# Patient Record
Sex: FEMALE | Race: WHITE | NOT HISPANIC OR LATINO | Employment: FULL TIME | URBAN - METROPOLITAN AREA
[De-identification: names, ages, dates, MRNs, and addresses within clinical notes are randomized per-mention and may not be internally consistent; named-entity substitution may affect disease eponyms.]

---

## 2021-12-23 PROCEDURE — U0004 COV-19 TEST NON-CDC HGH THRU: HCPCS | Performed by: PERSONAL EMERGENCY RESPONSE ATTENDANT

## 2021-12-26 ENCOUNTER — TELEPHONE (OUTPATIENT)
Dept: URGENT CARE | Facility: CLINIC | Age: 36
End: 2021-12-26

## 2021-12-26 NOTE — TELEPHONE ENCOUNTER
12/26/2021 9:24 am called and verified patients birth date, and informed her that the covid test was negative.  Pt stated she is feeling better, slm

## 2022-06-22 PROBLEM — U07.1 COVID-19: Status: ACTIVE | Noted: 2022-05-24

## 2022-06-22 PROBLEM — M25.569 KNEE PAIN: Status: ACTIVE | Noted: 2017-02-15

## 2022-06-27 ENCOUNTER — LAB (OUTPATIENT)
Dept: LAB | Facility: HOSPITAL | Age: 37
End: 2022-06-27

## 2022-06-27 ENCOUNTER — OFFICE VISIT (OUTPATIENT)
Dept: GASTROENTEROLOGY | Facility: CLINIC | Age: 37
End: 2022-06-27

## 2022-06-27 VITALS
SYSTOLIC BLOOD PRESSURE: 122 MMHG | WEIGHT: 293 LBS | DIASTOLIC BLOOD PRESSURE: 80 MMHG | HEART RATE: 74 BPM | HEIGHT: 72 IN | TEMPERATURE: 97.4 F | OXYGEN SATURATION: 99 % | BODY MASS INDEX: 39.68 KG/M2

## 2022-06-27 DIAGNOSIS — K90.0 CELIAC SPRUE: ICD-10-CM

## 2022-06-27 DIAGNOSIS — K90.0 CELIAC SPRUE: Primary | ICD-10-CM

## 2022-06-27 LAB
25(OH)D3 SERPL-MCNC: 44.2 NG/ML (ref 30–100)
IRON 24H UR-MRATE: 48 MCG/DL (ref 37–145)
IRON SATN MFR SERPL: 11 % (ref 20–50)
TIBC SERPL-MCNC: 419 MCG/DL (ref 298–536)
TRANSFERRIN SERPL-MCNC: 281 MG/DL (ref 200–360)

## 2022-06-27 PROCEDURE — 84466 ASSAY OF TRANSFERRIN: CPT

## 2022-06-27 PROCEDURE — 99204 OFFICE O/P NEW MOD 45 MIN: CPT | Performed by: INTERNAL MEDICINE

## 2022-06-27 PROCEDURE — 82306 VITAMIN D 25 HYDROXY: CPT

## 2022-06-27 PROCEDURE — 83540 ASSAY OF IRON: CPT

## 2022-06-27 NOTE — PROGRESS NOTES
Patient Name: Adrianna Mendoza  YOB: 1985   Medical Record number: [id number]     PCP: Shawn Duenas MD    Chief Complaint   Patient presents with   • Celiac Disease       History of Present Illness:   HPI  I appreciate the consult for celiac sprue.  Ms. Mendoza is a 37-year-old with a history of asthma and hypertension.  She was recently diagnosed with celiac sprue after having endoscopy with small bowel biopsy performed by Dr. Aquino at the [facility].  The patient also had an elevated TTG level.  Ms. Mendoza states that she had experienced issues with abdominal discomfort that was generalized in location.  The patient was also having issues with bloating and diarrhea.  The patient did not having any problem with blood in the stool. Ms. Mendoza did see a nutrition specialist at the [facility] and has begun to consume a gluten-free diet.  The patient has improvement with  more formed stool and less bloating.  There is no history of unexplained skin rash.  She denies any joint pain at this time.  There is no history of night sweats, fever or chills.  There is a family history of rheumatoid arthritis in her mother.  Her maternal grandmother also had colon cancer.  Past Medical History:   Diagnosis Date   • Asthma    • Celiac disease    • Hypertension        Past Surgical History:   Procedure Laterality Date   • DENTAL PROCEDURE     • TONSILLECTOMY           Current Outpatient Medications:   •  bisoprolol-hydrochlorothiazide (ZIAC) 10-6.25 MG per tablet, , Disp: , Rfl:   •  Fexofenadine HCl (ALLEGRA ALLERGY PO), Allegra Allergy, Disp: , Rfl:   •  fluticasone (FLONASE) 50 MCG/ACT nasal spray, fluticasone propionate 50 mcg/actuation nasal spray,suspension  Spray 2 sprays every day by intranasal route., Disp: , Rfl:   •  montelukast (SINGULAIR) 10 MG tablet, montelukast 10 mg tablet  Take 1 tablet every day by oral route for 90 days., Disp: , Rfl:   •  omeprazole (priLOSEC) 20 MG  capsule, omeprazole 20 mg capsule,delayed release  Take 1 capsule every day by oral route., Disp: , Rfl:   •  Semaglutide-Weight Management (Wegovy) 2.4 MG/0.75ML solution auto-injector, Wegovy 2.4 mg/0.75 mL subcutaneous pen injector, Disp: , Rfl:   •  sertraline (ZOLOFT) 100 MG tablet, sertraline 100 mg tablet  take one tablet by mouth daily, Disp: , Rfl:     Allergies   Allergen Reactions   • Cinnamon Swelling       Family History   Problem Relation Age of Onset   • Colon cancer Maternal Grandmother    Mother -Rheumatoid arthritis    Social History     Socioeconomic History   • Marital status: Single   Tobacco Use   • Smoking status: Never Smoker   • Smokeless tobacco: Never Used   Vaping Use   • Vaping Use: Never used   Substance and Sexual Activity   • Alcohol use: Never   • Drug use: Never   • Sexual activity: Defer       Review of Systems   Constitutional: Negative for activity change, appetite change, fatigue, fever and unexpected weight change.   HENT: Negative for dental problem, hearing loss, mouth sores, postnasal drip, sneezing, trouble swallowing and voice change.    Eyes: Negative for pain, redness, itching and visual disturbance.   Respiratory: Negative for cough, choking, chest tightness, shortness of breath and wheezing.    Cardiovascular: Negative for chest pain, palpitations and leg swelling.   Gastrointestinal: Negative for abdominal distention, abdominal pain, anal bleeding, blood in stool, constipation, diarrhea, nausea, rectal pain and vomiting.   Endocrine: Negative for cold intolerance, heat intolerance, polydipsia, polyphagia and polyuria.   Genitourinary: Negative.  Negative for dysuria, enuresis, flank pain, hematuria and urgency.   Musculoskeletal: Negative for arthralgias, back pain, gait problem, joint swelling and myalgias.   Skin: Negative for color change, pallor and rash.   Allergic/Immunologic: Negative for environmental allergies, food allergies and immunocompromised state.    Neurological: Negative for dizziness, tremors, seizures, facial asymmetry, speech difficulty, numbness and headaches.   Hematological: Negative for adenopathy.   Psychiatric/Behavioral: Negative for behavioral problems, confusion, dysphoric mood, hallucinations and self-injury.       Vitals:    06/27/22 1520   BP: 122/80   Pulse: 74   Temp: 97.4 °F (36.3 °C)   SpO2: 99%       Physical Exam  Vitals reviewed.   Constitutional:       Appearance: She is obese. She is not ill-appearing.   HENT:      Head: Normocephalic and atraumatic.      Nose: Nose normal.      Mouth/Throat:      Mouth: Mucous membranes are moist.      Pharynx: Oropharynx is clear.   Eyes:      General: No scleral icterus.     Extraocular Movements: Extraocular movements intact.   Cardiovascular:      Rate and Rhythm: Normal rate and regular rhythm.      Heart sounds: No murmur heard.  Pulmonary:      Breath sounds: Normal breath sounds. No wheezing or rales.   Abdominal:      General: Bowel sounds are normal.      Palpations: Abdomen is soft.      Tenderness: There is no abdominal tenderness. There is no guarding.   Musculoskeletal:         General: No swelling or tenderness. Normal range of motion.      Cervical back: Normal range of motion and neck supple.   Skin:     General: Skin is dry.   Neurological:      Mental Status: She is alert and oriented to person, place, and time.   Psychiatric:         Mood and Affect: Mood normal.         Thought Content: Thought content normal.         Judgment: Judgment normal.         Diagnoses and all orders for this visit:    1. Celiac sprue (Primary)  -     Vitamin D 25 Hydroxy; Future  -     Iron Profile; Future    The patient has celiac sprue based upon the pathology and the abnormal celiac panel.  The symptoms have improved with a gluten-free diet.  Discussed the association of vitamin D and iron deficiency.  Also discussed the association with other autoimmune conditions.      Plan: Will check vitamin D  level.           Will check iron profile.           The patient was given the website for the Celiac Sprue Association.           Discussed having follow-up with a nutrition specialist on an annual basis.           Will follow-up in the office in 6 months.

## 2022-06-29 ENCOUNTER — TELEPHONE (OUTPATIENT)
Dept: GASTROENTEROLOGY | Facility: CLINIC | Age: 37
End: 2022-06-29

## 2023-01-09 ENCOUNTER — HOSPITAL ENCOUNTER (EMERGENCY)
Facility: HOSPITAL | Age: 38
Discharge: HOME | End: 2023-01-09
Attending: EMERGENCY MEDICINE | Admitting: EMERGENCY MEDICINE
Payer: COMMERCIAL

## 2023-01-09 ENCOUNTER — APPOINTMENT (OUTPATIENT)
Dept: GENERAL RADIOLOGY | Facility: HOSPITAL | Age: 38
End: 2023-01-09
Payer: COMMERCIAL

## 2023-01-09 VITALS
HEIGHT: 72 IN | TEMPERATURE: 97.7 F | RESPIRATION RATE: 17 BRPM | DIASTOLIC BLOOD PRESSURE: 65 MMHG | SYSTOLIC BLOOD PRESSURE: 114 MMHG | BODY MASS INDEX: 39.68 KG/M2 | OXYGEN SATURATION: 99 % | HEART RATE: 64 BPM | WEIGHT: 293 LBS

## 2023-01-09 DIAGNOSIS — R07.9 CHEST PAIN, UNSPECIFIED TYPE: Primary | ICD-10-CM

## 2023-01-09 LAB
ALBUMIN SERPL-MCNC: 4.5 G/DL (ref 3.5–5.2)
ALBUMIN/GLOB SERPL: 1.7 G/DL
ALP SERPL-CCNC: 90 U/L (ref 39–117)
ALT SERPL W P-5'-P-CCNC: 20 U/L (ref 1–33)
ANION GAP SERPL CALCULATED.3IONS-SCNC: 12 MMOL/L (ref 5–15)
AST SERPL-CCNC: 20 U/L (ref 1–32)
BASOPHILS # BLD AUTO: 0.04 10*3/MM3 (ref 0–0.2)
BASOPHILS NFR BLD AUTO: 0.7 % (ref 0–1.5)
BILIRUB SERPL-MCNC: 0.3 MG/DL (ref 0–1.2)
BUN SERPL-MCNC: 16 MG/DL (ref 6–20)
BUN/CREAT SERPL: 21.3 (ref 7–25)
CALCIUM SPEC-SCNC: 9.4 MG/DL (ref 8.6–10.5)
CHLORIDE SERPL-SCNC: 101 MMOL/L (ref 98–107)
CO2 SERPL-SCNC: 25 MMOL/L (ref 22–29)
CREAT SERPL-MCNC: 0.75 MG/DL (ref 0.57–1)
D DIMER PPP FEU-MCNC: <0.27 MCGFEU/ML (ref 0–0.5)
DEPRECATED RDW RBC AUTO: 44.6 FL (ref 37–54)
EGFRCR SERPLBLD CKD-EPI 2021: 104.7 ML/MIN/1.73
EOSINOPHIL # BLD AUTO: 0.21 10*3/MM3 (ref 0–0.4)
EOSINOPHIL NFR BLD AUTO: 3.5 % (ref 0.3–6.2)
ERYTHROCYTE [DISTWIDTH] IN BLOOD BY AUTOMATED COUNT: 13.9 % (ref 12.3–15.4)
GLOBULIN UR ELPH-MCNC: 2.7 GM/DL
GLUCOSE SERPL-MCNC: 90 MG/DL (ref 65–99)
HCT VFR BLD AUTO: 40.7 % (ref 34–46.6)
HGB BLD-MCNC: 13.3 G/DL (ref 12–15.9)
HOLD SPECIMEN: NORMAL
IMM GRANULOCYTES # BLD AUTO: 0.03 10*3/MM3 (ref 0–0.05)
IMM GRANULOCYTES NFR BLD AUTO: 0.5 % (ref 0–0.5)
LIPASE SERPL-CCNC: 41 U/L (ref 13–60)
LYMPHOCYTES # BLD AUTO: 1.81 10*3/MM3 (ref 0.7–3.1)
LYMPHOCYTES NFR BLD AUTO: 29.9 % (ref 19.6–45.3)
MCH RBC QN AUTO: 28.7 PG (ref 26.6–33)
MCHC RBC AUTO-ENTMCNC: 32.7 G/DL (ref 31.5–35.7)
MCV RBC AUTO: 87.7 FL (ref 79–97)
MONOCYTES # BLD AUTO: 0.38 10*3/MM3 (ref 0.1–0.9)
MONOCYTES NFR BLD AUTO: 6.3 % (ref 5–12)
NEUTROPHILS NFR BLD AUTO: 3.58 10*3/MM3 (ref 1.7–7)
NEUTROPHILS NFR BLD AUTO: 59.1 % (ref 42.7–76)
NRBC BLD AUTO-RTO: 0 /100 WBC (ref 0–0.2)
NT-PROBNP SERPL-MCNC: 6 PG/ML (ref 0–450)
PLATELET # BLD AUTO: 247 10*3/MM3 (ref 140–450)
PMV BLD AUTO: 9.8 FL (ref 6–12)
POTASSIUM SERPL-SCNC: 4 MMOL/L (ref 3.5–5.2)
PROT SERPL-MCNC: 7.2 G/DL (ref 6–8.5)
QT INTERVAL: 406 MS
QT INTERVAL: 422 MS
QTC INTERVAL: 445 MS
QTC INTERVAL: 450 MS
RBC # BLD AUTO: 4.64 10*6/MM3 (ref 3.77–5.28)
SODIUM SERPL-SCNC: 138 MMOL/L (ref 136–145)
TROPONIN T SERPL-MCNC: <0.01 NG/ML (ref 0–0.03)
TROPONIN T SERPL-MCNC: <0.01 NG/ML (ref 0–0.03)
WBC NRBC COR # BLD: 6.05 10*3/MM3 (ref 3.4–10.8)
WHOLE BLOOD HOLD COAG: NORMAL
WHOLE BLOOD HOLD SPECIMEN: NORMAL

## 2023-01-09 PROCEDURE — 99283 EMERGENCY DEPT VISIT LOW MDM: CPT

## 2023-01-09 PROCEDURE — 93005 ELECTROCARDIOGRAM TRACING: CPT | Performed by: EMERGENCY MEDICINE

## 2023-01-09 PROCEDURE — 85025 COMPLETE CBC W/AUTO DIFF WBC: CPT | Performed by: EMERGENCY MEDICINE

## 2023-01-09 PROCEDURE — 36415 COLL VENOUS BLD VENIPUNCTURE: CPT

## 2023-01-09 PROCEDURE — 83690 ASSAY OF LIPASE: CPT | Performed by: EMERGENCY MEDICINE

## 2023-01-09 PROCEDURE — 85379 FIBRIN DEGRADATION QUANT: CPT | Performed by: NURSE PRACTITIONER

## 2023-01-09 PROCEDURE — 84484 ASSAY OF TROPONIN QUANT: CPT | Performed by: EMERGENCY MEDICINE

## 2023-01-09 PROCEDURE — 80053 COMPREHEN METABOLIC PANEL: CPT | Performed by: EMERGENCY MEDICINE

## 2023-01-09 PROCEDURE — 71045 X-RAY EXAM CHEST 1 VIEW: CPT

## 2023-01-09 PROCEDURE — 83880 ASSAY OF NATRIURETIC PEPTIDE: CPT | Performed by: EMERGENCY MEDICINE

## 2023-01-09 PROCEDURE — 93005 ELECTROCARDIOGRAM TRACING: CPT

## 2023-01-09 RX ORDER — SODIUM CHLORIDE 0.9 % (FLUSH) 0.9 %
10 SYRINGE (ML) INJECTION AS NEEDED
Status: DISCONTINUED | OUTPATIENT
Start: 2023-01-09 | End: 2023-01-09 | Stop reason: HOSPADM

## 2023-01-09 RX ORDER — ASPIRIN 81 MG/1
324 TABLET, CHEWABLE ORAL ONCE
Status: DISCONTINUED | OUTPATIENT
Start: 2023-01-09 | End: 2023-01-09 | Stop reason: HOSPADM

## 2023-01-09 RX ORDER — KETOROLAC TROMETHAMINE 15 MG/ML
15 INJECTION, SOLUTION INTRAMUSCULAR; INTRAVENOUS ONCE
Status: DISCONTINUED | OUTPATIENT
Start: 2023-01-09 | End: 2023-01-09 | Stop reason: HOSPADM

## 2023-01-09 NOTE — ED PROVIDER NOTES
Subjective   History of Present Illness  Adrianna Mendoza is a 37 yr old female that presents to the ER with c/o chest pain pain is on the right side of her chest.  Patient denies any shortness of breath.  Negative for nausea, vomiting.  She denies abdominal pain.  Negative for fever and chills.  She denies cough and congestion.  Negative for abdominal pain.  She denies any recent travel or immobilization.  No history of DVT or PE.  Patient's never had a cardiac stress or cardiac cath.    History provided by:  Patient   used: No    Chest Pain  Pain location:  R chest  Pain quality: aching    Pain radiates to:  Does not radiate  Pain severity:  Mild  Timing:  Constant  Progression:  Unchanged  Associated symptoms: no abdominal pain, no back pain, no cough, no dizziness, no dysphagia, no fatigue, no fever, no lower extremity edema, no nausea, no palpitations, no shortness of breath, no vomiting and no weakness        Review of Systems   Constitutional: Negative for chills, fatigue and fever.   HENT: Negative for trouble swallowing.    Respiratory: Negative for cough and shortness of breath.    Cardiovascular: Positive for chest pain. Negative for palpitations.   Gastrointestinal: Negative for abdominal pain, nausea and vomiting.   Musculoskeletal: Negative for back pain.   Neurological: Negative for dizziness and weakness.   All other systems reviewed and are negative.      Past Medical History:   Diagnosis Date   • Asthma    • Celiac disease    • Hypertension        Allergies   Allergen Reactions   • Cinnamon Swelling       Past Surgical History:   Procedure Laterality Date   • DENTAL PROCEDURE     • TONSILLECTOMY         Family History   Problem Relation Age of Onset   • Colon cancer Maternal Grandmother        Social History     Socioeconomic History   • Marital status: Single   Tobacco Use   • Smoking status: Never   • Smokeless tobacco: Never   Vaping Use   • Vaping Use: Never used    Substance and Sexual Activity   • Alcohol use: Never   • Drug use: Never   • Sexual activity: Defer           Objective   Physical Exam  Vitals and nursing note reviewed.   Constitutional:       Appearance: Normal appearance. She is well-developed. She is not toxic-appearing.   HENT:      Head: Normocephalic and atraumatic.   Eyes:      General: Lids are normal.      Extraocular Movements: Extraocular movements intact.      Conjunctiva/sclera: Conjunctivae normal.      Pupils: Pupils are equal, round, and reactive to light.   Neck:      Trachea: Trachea normal.   Cardiovascular:      Rate and Rhythm: Normal rate and regular rhythm.      Pulses: Normal pulses.      Heart sounds: Normal heart sounds.   Pulmonary:      Effort: Pulmonary effort is normal. No respiratory distress.      Breath sounds: Normal breath sounds. No decreased breath sounds, wheezing, rhonchi or rales.   Abdominal:      General: Bowel sounds are normal.      Palpations: Abdomen is soft.      Tenderness: There is no abdominal tenderness.   Musculoskeletal:         General: Normal range of motion.      Cervical back: Full passive range of motion without pain and normal range of motion.   Skin:     General: Skin is warm and dry.      Findings: No rash.   Neurological:      Mental Status: She is alert and oriented to person, place, and time.      Cranial Nerves: No cranial nerve deficit.   Psychiatric:         Speech: Speech normal.         Behavior: Behavior normal. Behavior is cooperative.         Procedures           ED Course  ED Course as of 01/09/23 1310   Mon Jan 09, 2023   1100 EKG was reviewed sinus rhythm no obvious signs of ischemia, injury or infarct. [KG]   1305 Second EKG reviewed.  Rate is 67 normal sinus rhythm with no obvious signs of ischemia, injury or infarct.  Patient has had 2 negative sets of cardiac enzymes.  Chest x-ray is normal.  Patient will be discharged home.  Patient to follow-up as outpatient.    Differential  diagnosis: Chest pain, MI, PE, pneumonia [KG]      ED Course User Index  [KG] Cayla Holden RUBY, APRN           Recent Results (from the past 24 hour(s))   ECG 12 Lead ED Triage Standing Order; Chest Pain    Collection Time: 01/09/23 10:01 AM   Result Value Ref Range    QT Interval 406 ms    QTC Interval 450 ms   Troponin    Collection Time: 01/09/23 10:05 AM    Specimen: Blood   Result Value Ref Range    Troponin T <0.010 0.000 - 0.030 ng/mL   Comprehensive Metabolic Panel    Collection Time: 01/09/23 10:05 AM    Specimen: Blood   Result Value Ref Range    Glucose 90 65 - 99 mg/dL    BUN 16 6 - 20 mg/dL    Creatinine 0.75 0.57 - 1.00 mg/dL    Sodium 138 136 - 145 mmol/L    Potassium 4.0 3.5 - 5.2 mmol/L    Chloride 101 98 - 107 mmol/L    CO2 25.0 22.0 - 29.0 mmol/L    Calcium 9.4 8.6 - 10.5 mg/dL    Total Protein 7.2 6.0 - 8.5 g/dL    Albumin 4.5 3.5 - 5.2 g/dL    ALT (SGPT) 20 1 - 33 U/L    AST (SGOT) 20 1 - 32 U/L    Alkaline Phosphatase 90 39 - 117 U/L    Total Bilirubin 0.3 0.0 - 1.2 mg/dL    Globulin 2.7 gm/dL    A/G Ratio 1.7 g/dL    BUN/Creatinine Ratio 21.3 7.0 - 25.0    Anion Gap 12.0 5.0 - 15.0 mmol/L    eGFR 104.7 >60.0 mL/min/1.73   Lipase    Collection Time: 01/09/23 10:05 AM    Specimen: Blood   Result Value Ref Range    Lipase 41 13 - 60 U/L   BNP    Collection Time: 01/09/23 10:05 AM    Specimen: Blood   Result Value Ref Range    proBNP 6.0 0.0 - 450.0 pg/mL   Green Top (Gel)    Collection Time: 01/09/23 10:05 AM   Result Value Ref Range    Extra Tube Hold for add-ons.    Lavender Top    Collection Time: 01/09/23 10:05 AM   Result Value Ref Range    Extra Tube hold for add-on    Gold Top - SST    Collection Time: 01/09/23 10:05 AM   Result Value Ref Range    Extra Tube Hold for add-ons.    Light Blue Top    Collection Time: 01/09/23 10:05 AM   Result Value Ref Range    Extra Tube Hold for add-ons.    CBC Auto Differential    Collection Time: 01/09/23 10:05 AM    Specimen: Blood   Result Value Ref  Range    WBC 6.05 3.40 - 10.80 10*3/mm3    RBC 4.64 3.77 - 5.28 10*6/mm3    Hemoglobin 13.3 12.0 - 15.9 g/dL    Hematocrit 40.7 34.0 - 46.6 %    MCV 87.7 79.0 - 97.0 fL    MCH 28.7 26.6 - 33.0 pg    MCHC 32.7 31.5 - 35.7 g/dL    RDW 13.9 12.3 - 15.4 %    RDW-SD 44.6 37.0 - 54.0 fl    MPV 9.8 6.0 - 12.0 fL    Platelets 247 140 - 450 10*3/mm3    Neutrophil % 59.1 42.7 - 76.0 %    Lymphocyte % 29.9 19.6 - 45.3 %    Monocyte % 6.3 5.0 - 12.0 %    Eosinophil % 3.5 0.3 - 6.2 %    Basophil % 0.7 0.0 - 1.5 %    Immature Grans % 0.5 0.0 - 0.5 %    Neutrophils, Absolute 3.58 1.70 - 7.00 10*3/mm3    Lymphocytes, Absolute 1.81 0.70 - 3.10 10*3/mm3    Monocytes, Absolute 0.38 0.10 - 0.90 10*3/mm3    Eosinophils, Absolute 0.21 0.00 - 0.40 10*3/mm3    Basophils, Absolute 0.04 0.00 - 0.20 10*3/mm3    Immature Grans, Absolute 0.03 0.00 - 0.05 10*3/mm3    nRBC 0.0 0.0 - 0.2 /100 WBC   D-dimer, Quantitative    Collection Time: 01/09/23 10:05 AM    Specimen: Blood   Result Value Ref Range    D-Dimer, Quantitative <0.27 0.00 - 0.50 MCGFEU/mL   ECG 12 Lead ED Triage Standing Order; Chest Pain    Collection Time: 01/09/23 12:29 PM   Result Value Ref Range    QT Interval 422 ms    QTC Interval 445 ms   Troponin    Collection Time: 01/09/23 12:34 PM    Specimen: Arm, Left; Blood   Result Value Ref Range    Troponin T <0.010 0.000 - 0.030 ng/mL     Note: In addition to lab results from this visit, the labs listed above may include labs taken at another facility or during a different encounter within the last 24 hours. Please correlate lab times with ED admission and discharge times for further clarification of the services performed during this visit.    XR Chest 1 View   Final Result   Impression:   No radiographic evidence of acute cardiopulmonary process.      Electronically Signed: Basilio Bennett     1/9/2023 10:46 AM EST     Workstation ID: [id number]        Vitals:    01/09/23 0957   BP: 129/56   BP Location: Left arm   Patient  "Position: Sitting   Pulse: 76   Resp: 18   Temp: 97.7 °F (36.5 °C)   TempSrc: Oral   SpO2: 99%   Weight: (!) 137 kg (302 lb)   Height: 182.9 cm (72\")     Medications   sodium chloride 0.9 % flush 10 mL (has no administration in time range)   aspirin chewable tablet 324 mg (324 mg Oral Not Given 1/9/23 1106)   sodium chloride 0.9 % flush 10 mL (has no administration in time range)   ketorolac (TORADOL) injection 15 mg (15 mg Intravenous Not Given 1/9/23 1104)     ECG/EMG Results (last 24 hours)     Procedure Component Value Units Date/Time    ECG 12 Lead ED Triage Standing Order; Chest Pain [[id number]] Collected: 01/09/23 1001     Updated: 01/09/23 1050     QT Interval 406 ms      QTC Interval 450 ms     Narrative:      Test Reason : ED Triage Standing Order~  Blood Pressure :   */*   mmHG  Vent. Rate :  74 BPM     Atrial Rate :  74 BPM     P-R Int : 174 ms          QRS Dur :  96 ms      QT Int : 406 ms       P-R-T Axes :  41 -22 -16 degrees     QTc Int : 450 ms    Normal sinus rhythm  Cannot rule out Anterior infarct , age undetermined  Abnormal ECG  No previous ECGs available    Referred By: EDMD           Confirmed By:     ECG 12 Lead ED Triage Standing Order; Chest Pain [[id number]] Collected: 01/09/23 1229     Updated: 01/09/23 1229     QT Interval 422 ms      QTC Interval 445 ms     Narrative:      Test Reason : 2ND SET  Blood Pressure :   */*   mmHG  Vent. Rate :  67 BPM     Atrial Rate :  67 BPM     P-R Int : 180 ms          QRS Dur :  92 ms      QT Int : 422 ms       P-R-T Axes :  39 -24 -16 degrees     QTc Int : 445 ms    Normal sinus rhythm  Minimal voltage criteria for LVH, may be normal variant  Borderline ECG  When compared with ECG of 09-JAN-2023 10:01, (Unconfirmed)  No significant change was found    Referred By: ED MD           Confirmed By:         ECG 12 Lead ED Triage Standing Order; Chest Pain   Preliminary Result   Test Reason : 2ND SET   Blood Pressure :   */*   mmHG   Vent. Rate :  67 BPM     " Atrial Rate :  67 BPM      P-R Int : 180 ms          QRS Dur :  92 ms       QT Int : 422 ms       P-R-T Axes :  39 -24 -16 degrees      QTc Int : 445 ms      Normal sinus rhythm   Minimal voltage criteria for LVH, may be normal variant   Borderline ECG   When compared with ECG of 09-JAN-2023 10:01, (Unconfirmed)   No significant change was found      Referred By: ED MD           Confirmed By:       ECG 12 Lead ED Triage Standing Order; Chest Pain   Preliminary Result   Test Reason : ED Triage Standing Order~   Blood Pressure :   */*   mmHG   Vent. Rate :  74 BPM     Atrial Rate :  74 BPM      P-R Int : 174 ms          QRS Dur :  96 ms       QT Int : 406 ms       P-R-T Axes :  41 -22 -16 degrees      QTc Int : 450 ms      Normal sinus rhythm   Cannot rule out Anterior infarct , age undetermined   Abnormal ECG   No previous ECGs available      Referred By: EDMD           Confirmed By:                                           MDM    Final diagnoses:   Chest pain, unspecified type       ED Disposition  ED Disposition     ED Disposition   Discharge    Condition   Stable    Comment   --             [facility] CARDIOLOG[facility]  [address]  [address]  [facility] [id number] [phone]        Shawn Duenas MD  [address] [address]  [phone]               Medication List      No changes were made to your prescriptions during this visit.          Cayla Holden, APRN  01/09/23 1310

## 2023-01-10 ENCOUNTER — TELEPHONE (OUTPATIENT)
Dept: GASTROENTEROLOGY | Facility: CLINIC | Age: 38
End: 2023-01-10
Payer: COMMERCIAL

## 2024-05-06 ENCOUNTER — OUTSIDE FACILITY SERVICE (OUTPATIENT)
Dept: GASTROENTEROLOGY | Facility: CLINIC | Age: 39
End: 2024-05-06
Payer: COMMERCIAL

## 2024-05-06 PROCEDURE — 88305 TISSUE EXAM BY PATHOLOGIST: CPT | Performed by: INTERNAL MEDICINE

## 2024-05-06 PROCEDURE — 43239 EGD BIOPSY SINGLE/MULTIPLE: CPT | Performed by: INTERNAL MEDICINE

## 2024-05-07 ENCOUNTER — LAB REQUISITION (OUTPATIENT)
Dept: LAB | Facility: HOSPITAL | Age: 39
End: 2024-05-07
Payer: COMMERCIAL

## 2024-05-07 DIAGNOSIS — R13.10 DYSPHAGIA, UNSPECIFIED: ICD-10-CM

## 2024-05-07 DIAGNOSIS — K21.9 GASTRO-ESOPHAGEAL REFLUX DISEASE WITHOUT ESOPHAGITIS: ICD-10-CM

## 2024-05-07 DIAGNOSIS — K21.00 GASTRO-ESOPHAGEAL REFLUX DISEASE WITH ESOPHAGITIS, WITHOUT BLEEDING: ICD-10-CM

## 2024-05-08 LAB — REF LAB TEST METHOD: NORMAL

## 2024-05-13 ENCOUNTER — TELEPHONE (OUTPATIENT)
Dept: GASTROENTEROLOGY | Facility: CLINIC | Age: 39
End: 2024-05-13
Payer: COMMERCIAL

## 2024-10-29 ENCOUNTER — OFFICE VISIT (OUTPATIENT)
Dept: CARDIOLOGY | Facility: CLINIC | Age: 39
End: 2024-10-29
Payer: COMMERCIAL

## 2024-10-29 ENCOUNTER — DOCUMENTATION (OUTPATIENT)
Dept: CARDIOLOGY | Facility: CLINIC | Age: 39
End: 2024-10-29

## 2024-10-29 VITALS
HEIGHT: 70 IN | WEIGHT: 293 LBS | SYSTOLIC BLOOD PRESSURE: 128 MMHG | BODY MASS INDEX: 41.95 KG/M2 | DIASTOLIC BLOOD PRESSURE: 84 MMHG | OXYGEN SATURATION: 96 % | HEART RATE: 67 BPM

## 2024-10-29 DIAGNOSIS — Z01.818 PREOPERATIVE CLEARANCE: ICD-10-CM

## 2024-10-29 DIAGNOSIS — E66.01 OBESITY, MORBID, BMI 40.0-49.9: Primary | ICD-10-CM

## 2024-10-29 RX ORDER — DOXYCYCLINE HYCLATE 50 MG/1
50 CAPSULE ORAL 2 TIMES DAILY
COMMUNITY
Start: 2024-10-16

## 2024-10-29 NOTE — PROGRESS NOTES
10/29/24    Re: Adrianna Mendoza, 1985   Procedure/Surgery -gastric sleeve surgery            Adrianna Mendoza, 1985 is a pleasant 47-year-old female with a normal EKG no symptoms of angina or heart failure.  She is considered acceptable risk for scheduled procedure/surgery from a cardiac standpoint.         Sincerely,          SANTOS Mares PA

## 2024-10-29 NOTE — PROGRESS NOTES
[facility] at [facility]  INITIAL OFFICE CONSULT      Adrianna Mendoza  1985  PCP: Shawn Duenas MD    SUBJECTIVE:   Adrianna Mendoza is a 39 y.o. female seen for a consultation visit regarding the following:     Chief Complaint:   Chief Complaint   Patient presents with    Cardiac Clearance          Consultation is requested by No ref. provider found for evaluation of Cardiac Clearance        History:  Pleasant 39-year-old female  presents today for initial consultation regarding need for preop evaluation prior to consideration for gastric sleeve surgery.  She denies any previous cardiac history.  She does take medication for blood pressure which is well-controlled.  She has had difficult time losing weight for many years.  She does do HIT exercises once a week and walks every day.  She is not having difficulty chest pain floors with exertion.  She has had no palpitations dizziness near syncope sink.  She has no reservations about pursuing the the procedure she recently had an appendectomy and tolerated that procedure quite well.  She presents today for further cardiac evaluation.      Cardiac PMH: (Old records have been reviewed and summarized below)  Pre op evulation  GERD  Obesity,BMI 46.5   HTN: Ziac rx.   HLD, Mild  Appendectomy 1/2024.     Past Medical History, Past Surgical History, Family history, Social History, and Medications were all reviewed with the patient today and updated as necessary.     Current Outpatient Medications   Medication Sig Dispense Refill    bisoprolol-hydrochlorothiazide (ZIAC) 10-6.25 MG per tablet       doxycycline (VIBRAMYCIN) 50 MG capsule Take 1 capsule by mouth 2 (Two) Times a Day.      Fexofenadine HCl (ALLEGRA ALLERGY PO) Allegra Allergy      fluticasone (FLONASE) 50 MCG/ACT nasal spray fluticasone propionate 50 mcg/actuation nasal spray,suspension   Spray 2 sprays every day by intranasal route.      montelukast  (SINGULAIR) 10 MG tablet montelukast 10 mg tablet   Take 1 tablet every day by oral route for 90 days.      omeprazole (priLOSEC) 20 MG capsule omeprazole 20 mg capsule,delayed release   Take 1 capsule every day by oral route.      Semaglutide-Weight Management (Wegovy) 2.4 MG/0.75ML solution auto-injector Wegovy 2.4 mg/0.75 mL subcutaneous pen injector      sertraline (ZOLOFT) 100 MG tablet sertraline 100 mg tablet   take one tablet by mouth daily       No current facility-administered medications for this visit.     Allergies   Allergen Reactions    Cinnamon Swelling         Past Medical History:   Diagnosis Date    Asthma     Celiac disease     Hypertension      Past Surgical History:   Procedure Laterality Date    APPENDECTOMY  01/2024    DENTAL PROCEDURE      TONSILLECTOMY       Family History   Problem Relation Age of Onset    Hypertension Mother     Hypertension Father     Hypertension Brother     Colon cancer Maternal Grandmother     Heart attack Maternal Grandmother     Heart attack Paternal Grandmother      Social History     Tobacco Use    Smoking status: Never     Passive exposure: Never    Smokeless tobacco: Never   Substance Use Topics    Alcohol use: Never       ROS:  Review of Symptoms:  General: no recent weight loss/gain, weakness or fatigue  Skin: no rashes, lumps, or other skin changes  HEENT: no dizziness, lightheadedness, or vision changes  Respiratory: no cough or hemoptysis  Cardiovascular: no palpitations, and tachycardia  Gastrointestinal: no black/tarry stools or diarrhea  Urinary: no change in frequency or urgency  Peripheral Vascular: no claudication or leg cramps  Musculoskeletal: no muscle or joint pain/stiffness  Psychiatric: no depression or excessive stress  Neurological: no sensory or motor loss, no syncope  Hematologic: no anemia, easy bruising or bleeding  Endocrine: no thyroid problems, nor heat or cold intolerance         PHYSICAL EXAM:   /84 (BP Location: Right arm,  "Patient Position: Sitting)   Pulse 67   Ht 177.8 cm (70\")   Wt (!) 147 kg (324 lb 3.2 oz)   SpO2 96%   BMI 46.52 kg/m²      Wt Readings from Last 5 Encounters:   10/29/24 (!) 147 kg (324 lb 3.2 oz)   23 (!) 137 kg (302 lb)   22 (!) 145 kg (320 lb)   22 (!) 145 kg (320 lb)   21 (!) 150 kg (330 lb)     BP Readings from Last 5 Encounters:   10/29/24 128/84   23 114/65   22 122/80   22 119/79   21 128/83       General-Well Nourished, Well developed  Eyes - PERRLA  Neck- supple, No mass  CV- regular rate and rhythm, no MRG  Lung- clear bilaterally  Abd- soft, +BS  Musc/skel - Norm strength and range of motion  Skin- warm and dry  Neuro - Alert & Oriented x 3, appropriate mood.    Patient's external notes were reviewed.  Independent interpretation of test performed by another physician in facility were reviewed.  Outside laboratory data was also reviewed.    Medical problems and test results were reviewed with the patient today.           No results found for: \"CHOL\", \"HDL\", \"HDLC\", \"LDL\", \"LDLC\", \"VLDL\"    EKG:  (EKG/Tracing has been independently visualized by me and summarized below)      ECG 12 Lead    Date/Time: 10/29/2024 10:27 AM  Performed by: Trung Sweeney PA    Authorized by: Trung Sweeney PA  Comparison: not compared with previous ECG   Rhythm: sinus rhythm  Rate: normal  Conduction: conduction normal  ST Segments: ST segments normal  QRS axis: normal    Clinical impression: normal ECG        Surgical Cardiac Risk Assessment    Patient Name: Adrianna Mendoza : 1985 MRN: [id number]      Revised Cardiac Risk Index (RCRI)     Clinical Risk Factors  Check if Present or Past    History    High-risk type of surgery (examples include vascular surgery and any open intraperitoneal or intrathoracic procedure) []   +1 point     History of ischemic heart disease (history of myocardial infarction or a positive exercise test, current complaint of " chest pain considered to be secondary to myocardial ischemia, use of nitrate therapy, or ECG with pathological Q waves; do not count prior coronary revascularization procedures unless one of the other criteria for ischemic heart disease is present) []   +1 point     History of heart failure []   +1 point     History of cerebrovascular disease []   +1 point     Diabetes mellitus requiring treatment with insulin   []   +1 point   Preoperative serum creatinine >2.0 mg/dL   (177 micromol/L)   []   +1 point       Rate of cardiac death, nonfatal myocardial infarction, and nonfatal cardiac arrest according to the number of predictors   Total Points       [x] 0= 0.4% (95% CI 0.1-0.8)       [] 1= 1.0% (95% CI 0.5-1.4)       [] 2= 2.4% (95% CI 1.3-3.5)       [] 3 or more= 5.4% (95% CI 2.8-7.9)         Rate of myocardial infarction, pulmonary edema, ventricular fibrillation, primary cardiac arrest, and complete heart block   Total Points       [x] 0= 0.5% (95% CI 0.2-1.1)       [] 1= 1.3% (95% CI 0.7-2.1)       [] 2= 3.6% (95% CI 2.1-5.6)       [] 3 or more= 9.1% (95% CI 5.5-13.8)       ASSESSMENT   1. Cardiac clerance for gastric sleeve surgery    2. Obesity, BMI 46.5 to    3. HTN: Controlled on Ziac    4.  Mild dyslipidemia she is working on diet exercise      PLAN  Pleasant 39-year-old female with no previous cardiac history normal EKG today.  She presents for cardiac clearance prior to gastric sleeve surgery.  He had she has no chest pain or shortness of breath.  She has a revised cardiac risk is that score less than 1%.  She is considered acceptable risk to pursue gastric sleeve surgery.      Cardiology/Electrophysiology  10/29/24  09:23 EDT  Electronically signed by SANTOS Mares, 10/29/24, 10:28 AM EDT.